# Patient Record
Sex: MALE | Race: AMERICAN INDIAN OR ALASKA NATIVE
[De-identification: names, ages, dates, MRNs, and addresses within clinical notes are randomized per-mention and may not be internally consistent; named-entity substitution may affect disease eponyms.]

---

## 2020-01-19 ENCOUNTER — HOSPITAL ENCOUNTER (EMERGENCY)
Dept: HOSPITAL 5 - ED | Age: 20
LOS: 1 days | Discharge: HOME | End: 2020-01-20
Payer: COMMERCIAL

## 2020-01-19 DIAGNOSIS — Z91.018: ICD-10-CM
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
## 2020-01-19 DIAGNOSIS — Z79.899: ICD-10-CM
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
## 2020-01-19 DIAGNOSIS — S91.331A: Primary | ICD-10-CM

## 2020-01-19 PROCEDURE — 90471 IMMUNIZATION ADMIN: CPT

## 2020-01-19 PROCEDURE — A6250 SKIN SEAL PROTECT MOISTURIZR: HCPCS

## 2020-01-19 PROCEDURE — 90715 TDAP VACCINE 7 YRS/> IM: CPT

## 2020-01-20 VITALS — DIASTOLIC BLOOD PRESSURE: 68 MMHG | SYSTOLIC BLOOD PRESSURE: 128 MMHG

## 2020-01-20 NOTE — EMERGENCY DEPARTMENT REPORT
HPI





- General


Chief Complaint: Puncture Wound


Time Seen by Provider: 20 02:15





- HPI


HPI: 





Room 35








The patient is a 19-year-old male presenting with chief complaint of right foot 

pain after stepping on a nail.  The patient states this evening at 19:00 while 

walking he stepped on a nail with his right foot.  The nail went through his 

shoe and into the sole of his right foot.  Patient gives his pain a score of 

9/10.  Patient states he does not recall the last time he received a tetanus 

shot








Location: [See above]


Duration: [See above]


Quality: [See above]


Severity: [See above]


Timing: [See above]


Context: [See above]


Modifying factors: [See above]


Associated signs and symptoms: [see above]








ED Past Medical Hx





- Past Medical History


Previous Medical History?: No





- Surgical History


Past Surgical History?: No





- Social History


Smoking Status: Never Smoker





- Medications


Home Medications: 


                                Home Medications











 Medication  Instructions  Recorded  Confirmed  Last Taken  Type


 


Brompheniramine/Pseudoephed/Dm 10 ml PO Q8H PRN #200 ml 16  Unknown Rx





[Bromfed Dm Cough Syrup]     


 


Fluticasone [Flonase] 1 spray NS QDAY #1 bottle 16  Unknown Rx


 


predniSONE [Deltasone] 20 mg PO QDAY #5 tab 16  Unknown Rx


 


HYDROcodone/APAP 5-325 [[place] 1 - 2 each PO Q6HR PRN #10 tablet 20  

Unknown Rx





5/325]     


 


Ibuprofen [Motrin 800 MG tab] 800 mg PO Q8HR PRN #20 tablet 20  Unknown Rx


 


levoFLOXacin [Levaquin] 750 mg PO QDAY #10 tablet 20  Unknown Rx














ED Review of Systems


ROS: 


Stated complaint: STEPPED ON NAIL/RT FOOT LAC


Other details as noted in HPI





Musculoskeletal: myalgia





Physical Exam





- Physical Exam


Vital Signs: 


                                   Vital Signs











  20





  23:30


 


Temperature 98.1 F


 


Pulse Rate 93 H


 


Respiratory 19





Rate 


 


Blood Pressure 147/96


 


O2 Sat by Pulse 99





Oximetry 











Physical Exam: 





GENERAL: The patient is well-developed well-nourished male lying on stretcher 

not appearing to be in acute distress. []


HEENT: Normocephalic.  Atraumatic.  Extraocular motions are intact.


CHEST/LUNGS:  There is no respiratory distress noted.


HEART/CARDIOVASCULAR: Regular.  There is no tachycardia.  2+ right DP


SKIN: There is a puncture wound to the sole of the right foot with surrounding 

dried blood.  There is no active bleeding.  Scant amount of ecchymosis to the 

dorsum of the right foot.  There is no diaphoresis.


NEURO: The patient is awake, alert, and oriented.  The patient is cooperative.  

The patient has no focal neurologic deficits.  The patient has normal speech


MUSCULOSKELETAL: There is tenderness to palpation of the sole and dorsum of 

right foot





ED Course


                                   Vital Signs











  20





  23:30


 


Temperature 98.1 F


 


Pulse Rate 93 H


 


Respiratory 19





Rate 


 


Blood Pressure 147/96


 


O2 Sat by Pulse 99





Oximetry 














ED Medical Decision Making





- Radiology Data


Radiology results: report reviewed (right foot x-ray), image reviewed (right 

foot x-ray)


interpreted by me: 





Right foot x-ray-no acute fracture, no foreign body





[facility]


[address] [place] [address]





XRay Report


Signed





Patient: BRET OLSON MR#:


[id number]


: 2000 Acct:[id number]





Age/Sex: 19 / M ADM Date: 20





Loc: ED


Attending Dr:








Ordering Physician: EVELIN MITCHELL MD


Date of Service: 20


Procedure(s): XR foot 3+V RT


Accession Number(s): [id number]





cc: ED MD STEPHEN





Fluoro Time In Minutes:





RIGHT FOOT 3 VIEWS





INDICATION:


Right foot pain.





COMPARISON:


No relevant prior imaging study available.





FINDINGS:


No acute fracture or dislocation is seen. No soft tissue swelling or foreign 

bodies.





There are no significant degenerative changes. No radiographic evidence of joint

 effusion.





IMPRESSION:


1. No acute findings.





Signer Name: Roger Martin MD


Signed: 2020 12:33 AM


Workstation Name: [id number]








Transcribed By: MAGALY


Dictated By: Roger Martin MD


Electronically Authenticated By: Roger Martin MD


Signed Date/Time: 20











DD/DT: 20


TD/TT:





- Differential Diagnosis


puncture wound right foot


Critical care attestation.: 


If time is entered above; I have spent that time in minutes in the direct care 

of this critically ill patient, excluding procedure time.








ED Disposition


Clinical Impression: 


 Puncture wound of right foot





Disposition: DC-01 TO HOME OR SELFCARE


Is pt being admited?: No


Does the pt Need Aspirin: No


Condition: Stable


Instructions:  Puncture Wound (ED)


Additional Instructions: 


Return to the emergency department should you develop worsening symptoms, 

inability to tolerate food or liquids, high fever or any other concerns


Prescriptions: 


levoFLOXacin [Levaquin] 750 mg PO QDAY #10 tablet


Ibuprofen [Motrin 800 MG tab] 800 mg PO Q8HR PRN #20 tablet


 PRN Reason: Pain, Moderate (4-6)


HYDROcodone/APAP 5-325 [[place] 5/325] 1 - 2 each PO Q6HR PRN #10 tablet


 PRN Reason: Pain


Referrals: 


RADHA MEZA MD [Staff Physician] - 3-5 Days (Dr. Meza is an 

orthopedic surgeon.  Please follow-up with him for further evaluation)


Time of Disposition: 02:26

## 2020-01-20 NOTE — XRAY REPORT
RIGHT FOOT 3 VIEWS



INDICATION:

Right foot pain.



COMPARISON:

No relevant prior imaging study available.



FINDINGS:

No acute fracture or dislocation is seen.  No soft tissue swelling or foreign bodies.



There are no significant degenerative changes.  No radiographic evidence of joint effusion.



IMPRESSION:

1. No acute findings.



Signer Name: Roger Martin MD 

Signed: 1/20/2020 12:33 AM

 Workstation Name: [id number]